# Patient Record
Sex: FEMALE | Race: WHITE | URBAN - METROPOLITAN AREA
[De-identification: names, ages, dates, MRNs, and addresses within clinical notes are randomized per-mention and may not be internally consistent; named-entity substitution may affect disease eponyms.]

---

## 2018-05-10 ENCOUNTER — APPOINTMENT (RX ONLY)
Dept: URBAN - METROPOLITAN AREA OTHER 12 | Facility: OTHER | Age: 68
Setting detail: DERMATOLOGY
End: 2018-05-10

## 2018-05-10 DIAGNOSIS — L90.0 LICHEN SCLEROSUS ET ATROPHICUS: ICD-10-CM

## 2018-05-10 DIAGNOSIS — L85.3 XEROSIS CUTIS: ICD-10-CM

## 2018-05-10 PROBLEM — E03.9 HYPOTHYROIDISM, UNSPECIFIED: Status: ACTIVE | Noted: 2018-05-10

## 2018-05-10 PROBLEM — E05.90 THYROTOXICOSIS, UNSPECIFIED WITHOUT THYROTOXIC CRISIS OR STORM: Status: ACTIVE | Noted: 2018-05-10

## 2018-05-10 PROBLEM — I10 ESSENTIAL (PRIMARY) HYPERTENSION: Status: ACTIVE | Noted: 2018-05-10

## 2018-05-10 PROBLEM — R30.0 DYSURIA: Status: ACTIVE | Noted: 2018-05-10

## 2018-05-10 PROCEDURE — ? TREATMENT REGIMEN

## 2018-05-10 PROCEDURE — 99202 OFFICE O/P NEW SF 15 MIN: CPT

## 2018-05-10 PROCEDURE — ? OTHER

## 2018-05-10 ASSESSMENT — LOCATION SIMPLE DESCRIPTION DERM
LOCATION SIMPLE: LEFT UPPER BACK
LOCATION SIMPLE: LEFT FOREARM
LOCATION SIMPLE: RIGHT FOREARM

## 2018-05-10 ASSESSMENT — LOCATION ZONE DERM
LOCATION ZONE: ARM
LOCATION ZONE: TRUNK

## 2018-05-10 ASSESSMENT — LOCATION DETAILED DESCRIPTION DERM
LOCATION DETAILED: RIGHT PROXIMAL DORSAL FOREARM
LOCATION DETAILED: LEFT SUPERIOR MEDIAL UPPER BACK
LOCATION DETAILED: LEFT PROXIMAL DORSAL FOREARM

## 2018-05-10 NOTE — PROCEDURE: TREATMENT REGIMEN
Plan: Call for refills of clobetasol 0.05% ointment as needed while in [place]
Continue Regimen: Clobatesol as prescribed by Dr. Grady
Initiate Treatment: Advised to apply clobetasol 0.05% ointment to left breast
Detail Level: Simple
Otc Regimen: CeraVe cream- savings card given
Detail Level: Generalized

## 2018-05-10 NOTE — PROCEDURE: OTHER
Other (Free Text): Normal exam except LS & A. Advised patient to follow up with PCP to discuss UTI vs. Atrophic Vaginitis
Detail Level: Simple
Note Text (......Xxx Chief Complaint.): This diagnosis correlates with the

## 2018-05-25 ENCOUNTER — APPOINTMENT (RX ONLY)
Dept: URBAN - METROPOLITAN AREA OTHER 12 | Facility: OTHER | Age: 68
Setting detail: DERMATOLOGY
End: 2018-05-25

## 2018-05-25 DIAGNOSIS — L30.4 ERYTHEMA INTERTRIGO: ICD-10-CM

## 2018-05-25 DIAGNOSIS — L98.8 OTHER SPECIFIED DISORDERS OF THE SKIN AND SUBCUTANEOUS TISSUE: ICD-10-CM

## 2018-05-25 PROCEDURE — ? PRESCRIPTION

## 2018-05-25 PROCEDURE — 99213 OFFICE O/P EST LOW 20 MIN: CPT

## 2018-05-25 PROCEDURE — ? TREATMENT REGIMEN

## 2018-05-25 RX ORDER — NYSTATIN 100000 [USP'U]/G
OINTMENT TOPICAL
Qty: 1 | Refills: 2 | Status: ERX

## 2018-05-25 NOTE — PROCEDURE: TREATMENT REGIMEN
Plan: Lichen Sclerosus is stable per patient. Skin not examined today
Detail Level: Zone
Otc Regimen: Vaseline, use as neeed
Otc Regimen: Vaseline, use as needed

## 2018-05-25 NOTE — HPI: RASH
How Severe Is Your Rash?: mild
Is This A New Presentation, Or A Follow-Up?: Follow Up Rash
Additional History: Patient states that the Lichen Sclerosus is stable. She has seen her PCP for re-evaluation. She has a Fistula surgery pending.

## 2018-05-30 ENCOUNTER — RX ONLY (OUTPATIENT)
Age: 68
Setting detail: RX ONLY
End: 2018-05-30

## 2018-05-30 RX ORDER — FLUCONAZOLE 150 MG/1
TABLET ORAL
Qty: 7 | Refills: 0 | Status: ERX

## 2021-02-08 ENCOUNTER — APPOINTMENT (RX ONLY)
Dept: URBAN - METROPOLITAN AREA OTHER 11 | Facility: OTHER | Age: 71
Setting detail: DERMATOLOGY
End: 2021-02-08

## 2021-02-08 DIAGNOSIS — L90.0 LICHEN SCLEROSUS ET ATROPHICUS: ICD-10-CM | Status: WELL CONTROLLED

## 2021-02-08 DIAGNOSIS — T49.0X5 ADVERSE EFFECT OF LOCAL ANTIFUNGAL, ANTI-INFECTIVE AND ANTI-INFLAMMATORY DRUGS: ICD-10-CM

## 2021-02-08 DIAGNOSIS — L30.4 ERYTHEMA INTERTRIGO: ICD-10-CM

## 2021-02-08 DIAGNOSIS — L85.3 XEROSIS CUTIS: ICD-10-CM

## 2021-02-08 PROBLEM — T49.0X5A ADVERSE EFFECT OF LOCAL ANTIFUNGAL, ANTI-INFECTIVE AND ANTI-INFLAMMATORY DRUGS, INITIAL ENCOUNTER: Status: ACTIVE | Noted: 2021-02-08

## 2021-02-08 PROCEDURE — 99213 OFFICE O/P EST LOW 20 MIN: CPT

## 2021-02-08 PROCEDURE — ? TREATMENT REGIMEN

## 2021-02-08 PROCEDURE — ? OTHER

## 2021-02-08 PROCEDURE — ? PRESCRIPTION

## 2021-02-08 PROCEDURE — ? COUNSELING

## 2021-02-08 RX ORDER — CLOBETASOL PROPIONATE 0.5 MG/G
OINTMENT TOPICAL BID
Qty: 1 | Refills: 6 | Status: ERX

## 2021-02-08 RX ORDER — TACROLIMUS 1 MG/G
OINTMENT TOPICAL
Qty: 1 | Refills: 11 | Status: ERX | COMMUNITY
Start: 2021-02-08

## 2021-02-08 RX ORDER — NYSTATIN 100000 [USP'U]/G
CREAM TOPICAL BID
Qty: 1 | Refills: 5 | Status: ERX | COMMUNITY
Start: 2021-02-08

## 2021-02-08 RX ADMIN — NYSTATIN THIN LAYER: 100000 CREAM TOPICAL at 00:00

## 2021-02-08 RX ADMIN — TACROLIMUS THIN LAYER: 1 OINTMENT TOPICAL at 00:00

## 2021-02-08 ASSESSMENT — LOCATION DETAILED DESCRIPTION DERM
LOCATION DETAILED: LEFT LATERAL ABDOMEN
LOCATION DETAILED: LEFT RADIAL DORSAL HAND
LOCATION DETAILED: LEFT MEDIAL POSTERIOR THIGH
LOCATION DETAILED: RIGHT MEDIAL THIGH
LOCATION DETAILED: RIGHT RADIAL DORSAL HAND
LOCATION DETAILED: LEFT LABIA MINORA

## 2021-02-08 ASSESSMENT — LOCATION ZONE DERM
LOCATION ZONE: VULVA
LOCATION ZONE: LEG
LOCATION ZONE: HAND
LOCATION ZONE: TRUNK

## 2021-02-08 ASSESSMENT — LOCATION SIMPLE DESCRIPTION DERM
LOCATION SIMPLE: RIGHT LOWER EXTREMITY
LOCATION SIMPLE: VULVA
LOCATION SIMPLE: ABDOMEN
LOCATION SIMPLE: LEFT INNER THIGH
LOCATION SIMPLE: LEFT HAND
LOCATION SIMPLE: RIGHT HAND

## 2021-02-08 NOTE — PROCEDURE: COUNSELING
Detail Level: Simple
Detail Level: Zone
Patient Specific Counseling (Will Not Stick From Patient To Patient): Explained to pt that over time, the clobetasol she has been using in her genital region seems to have migrated out and onto her medial thighs causing this steroid atrophy.\\nSince she only needs the clobetasol sparingly now, this will hopefully start to improve
Detail Level: Detailed

## 2021-02-08 NOTE — PROCEDURE: TREATMENT REGIMEN
Otc Regimen: Diaper cream for irritation on buttocks.
Continue Regimen: Advised to switch clobetasol use to weekends once or twice daily and start tacrolimus on weekdays
Detail Level: Simple
Detail Level: Zone
Samples Given: Diaper rash cream, cutemol

## 2021-02-08 NOTE — PROCEDURE: OTHER
Other (Free Text): Dry skin handout given.
Note Text (......Xxx Chief Complaint.): This diagnosis correlates with the
Detail Level: Zone
Render Risk Assessment In Note?: no

## 2021-02-08 NOTE — HPI: SKIN LESION
Is This A New Presentation, Or A Follow-Up?: Skin Lesion
Has Your Skin Lesion Been Treated?: not been treated
Additional History: Silver cream being applied every day. Has had one similar lesion under the breast in the past, which turned into a yeast infection.

## 2021-02-08 NOTE — HPI: RASH (LICHEN SCLEROSUS ET ATROPHICUS)
Is This A New Presentation, Or A Follow-Up?: Rash
Additional History: Uses Clobetasol ointment every other day, which helps with itching.

## 2023-08-23 ENCOUNTER — APPOINTMENT (RX ONLY)
Dept: URBAN - METROPOLITAN AREA OTHER 11 | Facility: OTHER | Age: 73
Setting detail: DERMATOLOGY
End: 2023-08-23

## 2023-08-23 DIAGNOSIS — L90.0 LICHEN SCLEROSUS ET ATROPHICUS: ICD-10-CM

## 2023-08-23 DIAGNOSIS — I78.1 NEVUS, NON-NEOPLASTIC: ICD-10-CM

## 2023-08-23 PROCEDURE — ? COUNSELING

## 2023-08-23 PROCEDURE — ? PRESCRIPTION MEDICATION MANAGEMENT

## 2023-08-23 PROCEDURE — ? PRESCRIPTION

## 2023-08-23 PROCEDURE — 99213 OFFICE O/P EST LOW 20 MIN: CPT

## 2023-08-23 RX ORDER — CLOBETASOL PROPIONATE 0.5 MG/G
THIN LAYER OINTMENT TOPICAL BID
Qty: 60 | Refills: 11 | Status: ERX

## 2023-08-23 RX ORDER — TACROLIMUS 1 MG/G
OINTMENT TOPICAL
Qty: 60 | Refills: 11 | Status: ERX

## 2023-08-23 ASSESSMENT — LOCATION DETAILED DESCRIPTION DERM
LOCATION DETAILED: LEFT LABIA MINORA
LOCATION DETAILED: RIGHT LATERAL INFERIOR EYELID

## 2023-08-23 ASSESSMENT — LOCATION SIMPLE DESCRIPTION DERM
LOCATION SIMPLE: VULVA
LOCATION SIMPLE: RIGHT INFERIOR EYELID

## 2023-08-23 ASSESSMENT — LOCATION ZONE DERM
LOCATION ZONE: EYELID
LOCATION ZONE: VULVA

## 2023-08-23 NOTE — PROCEDURE: PRESCRIPTION MEDICATION MANAGEMENT
Render In Strict Bullet Format?: No
Detail Level: Zone
Modify Regimen: Use clobetesol 0.05% ointment more frequently. Patient was told to use the ointment twice a day for a month then switch back to sparing use.\\nTrial protopic, educated pt about protopic use after acute flare is managed, pt instructed to switch to protopic after 1 month of clobetasol

## 2023-10-18 ENCOUNTER — APPOINTMENT (RX ONLY)
Dept: URBAN - METROPOLITAN AREA OTHER 11 | Facility: OTHER | Age: 73
Setting detail: DERMATOLOGY
End: 2023-10-18

## 2023-10-18 DIAGNOSIS — L30.4 ERYTHEMA INTERTRIGO: ICD-10-CM

## 2023-10-18 DIAGNOSIS — L90.0 LICHEN SCLEROSUS ET ATROPHICUS: ICD-10-CM

## 2023-10-18 PROCEDURE — ? COUNSELING

## 2023-10-18 PROCEDURE — 99213 OFFICE O/P EST LOW 20 MIN: CPT

## 2023-10-18 PROCEDURE — ? PRESCRIPTION MEDICATION MANAGEMENT

## 2023-10-18 ASSESSMENT — LOCATION SIMPLE DESCRIPTION DERM
LOCATION SIMPLE: PERINEUM
LOCATION SIMPLE: GLUTEAL CLEFT
LOCATION SIMPLE: PREPUCE OF CLITORIS
LOCATION SIMPLE: PERINEAL BODY

## 2023-10-18 ASSESSMENT — LOCATION DETAILED DESCRIPTION DERM
LOCATION DETAILED: PREPUCE OF CLITORIS
LOCATION DETAILED: GLUTEAL CLEFT
LOCATION DETAILED: PERINEUM
LOCATION DETAILED: PERINEAL BODY

## 2023-10-18 ASSESSMENT — LOCATION ZONE DERM
LOCATION ZONE: ANUS
LOCATION ZONE: CLITORIS
LOCATION ZONE: TRUNK

## 2023-10-18 NOTE — PROCEDURE: PRESCRIPTION MEDICATION MANAGEMENT
Detail Level: Zone
Render In Strict Bullet Format?: No
Plan: Patient has not been applying Clobetasol to perineum and anus, she was unaware those areas were involved but has been experiencing itching which she thought was a different rash. I explained to pt on exam LS extends to the anus.
Modify Regimen: Apply Clobetasol to front of the perineum down to the anal area instead of just the vaginal area BID x 1 month, then decrease to Clobetasol qam, protopic qpm x 1 month, then protopic BID for maintenance

## 2024-01-18 ENCOUNTER — APPOINTMENT (RX ONLY)
Dept: URBAN - METROPOLITAN AREA OTHER 11 | Facility: OTHER | Age: 74
Setting detail: DERMATOLOGY
End: 2024-01-18

## 2024-01-18 DIAGNOSIS — L90.0 LICHEN SCLEROSUS ET ATROPHICUS: ICD-10-CM

## 2024-01-18 PROCEDURE — 99213 OFFICE O/P EST LOW 20 MIN: CPT

## 2024-01-18 PROCEDURE — ? TREATMENT REGIMEN

## 2024-01-18 PROCEDURE — ? COUNSELING

## 2024-01-18 ASSESSMENT — LOCATION SIMPLE DESCRIPTION DERM: LOCATION SIMPLE: VULVA

## 2024-01-18 ASSESSMENT — LOCATION ZONE DERM: LOCATION ZONE: VULVA

## 2024-01-18 ASSESSMENT — LOCATION DETAILED DESCRIPTION DERM: LOCATION DETAILED: LEFT LABIA MAJORA

## 2024-01-18 NOTE — PROCEDURE: TREATMENT REGIMEN
Detail Level: Zone
Continue Regimen: Clobetasol ointment to affected area BID x 3 weeks then switch to Protopic ointment

## 2024-07-18 ENCOUNTER — APPOINTMENT (RX ONLY)
Dept: URBAN - METROPOLITAN AREA OTHER 11 | Facility: OTHER | Age: 74
Setting detail: DERMATOLOGY
End: 2024-07-18

## 2024-07-18 DIAGNOSIS — L30.4 ERYTHEMA INTERTRIGO: ICD-10-CM

## 2024-07-18 DIAGNOSIS — B00.1 HERPESVIRAL VESICULAR DERMATITIS: ICD-10-CM

## 2024-07-18 DIAGNOSIS — L81.0 POSTINFLAMMATORY HYPERPIGMENTATION: ICD-10-CM

## 2024-07-18 DIAGNOSIS — L90.0 LICHEN SCLEROSUS ET ATROPHICUS: ICD-10-CM

## 2024-07-18 PROCEDURE — ? TREATMENT REGIMEN

## 2024-07-18 PROCEDURE — ? COUNSELING

## 2024-07-18 PROCEDURE — ? PRESCRIPTION

## 2024-07-18 PROCEDURE — 99213 OFFICE O/P EST LOW 20 MIN: CPT

## 2024-07-18 RX ORDER — TACROLIMUS 1 MG/G
THIN LAYER OINTMENT TOPICAL BID
Qty: 60 | Refills: 11 | Status: ERX

## 2024-07-18 RX ORDER — NYSTATIN CREAM 100000 [USP'U]/G
THIN LAYER CREAM TOPICAL BID
Qty: 30 | Refills: 11 | Status: ERX

## 2024-07-18 RX ORDER — CLOBETASOL PROPIONATE 0.5 MG/G
THIN LAYER OINTMENT TOPICAL BID
Qty: 60 | Refills: 11 | Status: ERX

## 2024-07-18 RX ORDER — VALACYCLOVIR HYDROCHLORIDE 1 G/1
1 TABLET, FILM COATED ORAL
Qty: 40 | Refills: 0 | Status: ERX

## 2024-07-18 ASSESSMENT — LOCATION ZONE DERM
LOCATION ZONE: VULVA
LOCATION ZONE: TRUNK
LOCATION ZONE: LIP

## 2024-07-18 ASSESSMENT — LOCATION DETAILED DESCRIPTION DERM
LOCATION DETAILED: LEFT LABIA MAJORA
LOCATION DETAILED: GLUTEAL CLEFT
LOCATION DETAILED: LEFT LOWER CUTANEOUS LIP
LOCATION DETAILED: RIGHT INFRAMAMMARY CREASE (INNER QUADRANT)
LOCATION DETAILED: RIGHT LATERAL ABDOMEN

## 2024-07-18 ASSESSMENT — LOCATION SIMPLE DESCRIPTION DERM
LOCATION SIMPLE: LEFT LIP
LOCATION SIMPLE: ABDOMEN
LOCATION SIMPLE: VULVA
LOCATION SIMPLE: GLUTEAL CLEFT
LOCATION SIMPLE: RIGHT BREAST

## 2024-07-18 NOTE — PROCEDURE: TREATMENT REGIMEN
Plan: Flare appear most c/w HSV, however, pt has not had her shingrix vaccine, will treat for shingles.
Detail Level: Zone
Initiate Treatment: Valtrex 1 gram tablet Take 1 tablets po three times a day for 10 days
Continue Regimen: clobetasol 0.05 % topical ointment Apply a thin layer  to affected area on labia BID for 3weeks, then switch to tacrolimus for maintenance. Repeat prn flares\\n\\ntacrolimus 0.1 % topical ointment Apply thin layer to affected area BID for maintenance

## 2025-07-17 ENCOUNTER — APPOINTMENT (OUTPATIENT)
Dept: URBAN - METROPOLITAN AREA OTHER 11 | Facility: OTHER | Age: 75
Setting detail: DERMATOLOGY
End: 2025-07-17

## 2025-07-17 DIAGNOSIS — L30.9 DERMATITIS, UNSPECIFIED: ICD-10-CM

## 2025-07-17 DIAGNOSIS — L90.0 LICHEN SCLEROSUS ET ATROPHICUS: ICD-10-CM | Status: WELL CONTROLLED

## 2025-07-17 DIAGNOSIS — L30.4 ERYTHEMA INTERTRIGO: ICD-10-CM

## 2025-07-17 DIAGNOSIS — L81.0 POSTINFLAMMATORY HYPERPIGMENTATION: ICD-10-CM

## 2025-07-17 DIAGNOSIS — D22 MELANOCYTIC NEVI: ICD-10-CM

## 2025-07-17 PROBLEM — D22.5 MELANOCYTIC NEVI OF TRUNK: Status: ACTIVE | Noted: 2025-07-17

## 2025-07-17 PROCEDURE — ? TREATMENT REGIMEN

## 2025-07-17 PROCEDURE — ? PRESCRIPTION

## 2025-07-17 PROCEDURE — ? COUNSELING

## 2025-07-17 RX ORDER — TACROLIMUS 1 MG/G
THIN LAYER OINTMENT TOPICAL BID
Qty: 60 | Refills: 11 | Status: ERX

## 2025-07-17 RX ORDER — NYSTATIN 100000 U/G
CREAM TOPICAL BID
Qty: 30 | Refills: 11 | Status: ERX

## 2025-07-17 RX ORDER — CLOBETASOL PROPIONATE 0.5 MG/G
THIN LAYER OINTMENT TOPICAL BID
Qty: 60 | Refills: 11 | Status: ERX

## 2025-07-17 ASSESSMENT — LOCATION ZONE DERM
LOCATION ZONE: VULVA
LOCATION ZONE: TRUNK

## 2025-07-17 ASSESSMENT — LOCATION DETAILED DESCRIPTION DERM
LOCATION DETAILED: EPIGASTRIC SKIN
LOCATION DETAILED: LEFT LABIA MAJORA
LOCATION DETAILED: LEFT LATERAL ABDOMEN
LOCATION DETAILED: RIGHT LATERAL ABDOMEN
LOCATION DETAILED: LEFT RIB CAGE
LOCATION DETAILED: RIGHT MEDIAL UPPER BACK

## 2025-07-17 ASSESSMENT — LOCATION SIMPLE DESCRIPTION DERM
LOCATION SIMPLE: RIGHT UPPER BACK
LOCATION SIMPLE: ABDOMEN
LOCATION SIMPLE: VULVA